# Patient Record
Sex: FEMALE | Race: ASIAN | NOT HISPANIC OR LATINO | URBAN - METROPOLITAN AREA
[De-identification: names, ages, dates, MRNs, and addresses within clinical notes are randomized per-mention and may not be internally consistent; named-entity substitution may affect disease eponyms.]

---

## 2023-05-17 ENCOUNTER — OFFICE VISIT (OUTPATIENT)
Dept: OBSTETRICS AND GYNECOLOGY | Facility: CLINIC | Age: 38
End: 2023-05-17
Payer: COMMERCIAL

## 2023-05-17 ENCOUNTER — APPOINTMENT (OUTPATIENT)
Dept: LAB | Facility: HOSPITAL | Age: 38
End: 2023-05-17
Attending: OBSTETRICS & GYNECOLOGY
Payer: COMMERCIAL

## 2023-05-17 VITALS — DIASTOLIC BLOOD PRESSURE: 78 MMHG | SYSTOLIC BLOOD PRESSURE: 100 MMHG | WEIGHT: 135.81 LBS

## 2023-05-17 DIAGNOSIS — Z12.31 BREAST CANCER SCREENING BY MAMMOGRAM: ICD-10-CM

## 2023-05-17 DIAGNOSIS — Z11.3 SCREENING FOR STD (SEXUALLY TRANSMITTED DISEASE): ICD-10-CM

## 2023-05-17 DIAGNOSIS — Z11.3 SCREEN FOR STD (SEXUALLY TRANSMITTED DISEASE): Primary | ICD-10-CM

## 2023-05-17 DIAGNOSIS — Z30.09 FAMILY PLANNING: ICD-10-CM

## 2023-05-17 DIAGNOSIS — Z01.419 WELL WOMAN EXAM WITH ROUTINE GYNECOLOGICAL EXAM: Primary | ICD-10-CM

## 2023-05-17 DIAGNOSIS — Z12.4 ENCOUNTER FOR PAPANICOLAOU SMEAR FOR CERVICAL CANCER SCREENING: ICD-10-CM

## 2023-05-17 PROCEDURE — 99999 PR PBB SHADOW E&M-NEW PATIENT-LVL II: CPT | Mod: PBBFAC,,, | Performed by: OBSTETRICS & GYNECOLOGY

## 2023-05-17 PROCEDURE — 88175 CYTOPATH C/V AUTO FLUID REDO: CPT | Performed by: PATHOLOGY

## 2023-05-17 PROCEDURE — 1159F PR MEDICATION LIST DOCUMENTED IN MEDICAL RECORD: ICD-10-PCS | Mod: CPTII,S$GLB,, | Performed by: OBSTETRICS & GYNECOLOGY

## 2023-05-17 PROCEDURE — 99386 PREV VISIT NEW AGE 40-64: CPT | Mod: S$GLB,,, | Performed by: OBSTETRICS & GYNECOLOGY

## 2023-05-17 PROCEDURE — 99999 PR PBB SHADOW E&M-NEW PATIENT-LVL II: ICD-10-PCS | Mod: PBBFAC,,, | Performed by: OBSTETRICS & GYNECOLOGY

## 2023-05-17 PROCEDURE — 1159F MED LIST DOCD IN RCRD: CPT | Mod: CPTII,S$GLB,, | Performed by: OBSTETRICS & GYNECOLOGY

## 2023-05-17 PROCEDURE — 99386 PR PREVENTIVE VISIT,NEW,40-64: ICD-10-PCS | Mod: S$GLB,,, | Performed by: OBSTETRICS & GYNECOLOGY

## 2023-05-17 PROCEDURE — 3078F DIAST BP <80 MM HG: CPT | Mod: CPTII,S$GLB,, | Performed by: OBSTETRICS & GYNECOLOGY

## 2023-05-17 PROCEDURE — 3078F PR MOST RECENT DIASTOLIC BLOOD PRESSURE < 80 MM HG: ICD-10-PCS | Mod: CPTII,S$GLB,, | Performed by: OBSTETRICS & GYNECOLOGY

## 2023-05-17 PROCEDURE — 3074F PR MOST RECENT SYSTOLIC BLOOD PRESSURE < 130 MM HG: ICD-10-PCS | Mod: CPTII,S$GLB,, | Performed by: OBSTETRICS & GYNECOLOGY

## 2023-05-17 PROCEDURE — 87624 HPV HI-RISK TYP POOLED RSLT: CPT | Performed by: OBSTETRICS & GYNECOLOGY

## 2023-05-17 PROCEDURE — 3074F SYST BP LT 130 MM HG: CPT | Mod: CPTII,S$GLB,, | Performed by: OBSTETRICS & GYNECOLOGY

## 2023-05-17 PROCEDURE — 87591 N.GONORRHOEAE DNA AMP PROB: CPT | Performed by: OBSTETRICS & GYNECOLOGY

## 2023-05-17 NOTE — PROGRESS NOTES
SUBJECTIVE:   Yuval Weeks is a 38 y.o. female No obstetric history on file.  for annual well woman exam. Patient's last menstrual period was 2023 (exact date)..      Pt stated she recently had breast MMG in [place] in April and was told she will need FNA on left breast, BIRAD 3??    Contraception: none    No past medical history on file.  No past surgical history on file.  Social History     Socioeconomic History    Marital status:      No family history on file.  OB History    Para Term  AB Living   1         1   SAB IAB Ectopic Multiple Live Births                  # Outcome Date GA Lbr Mich/2nd Weight Sex Delivery Anes PTL Lv   1                Obstetric Comments   Gynhx: reg/5. Heavy   Denies abnl pap         No current outpatient medications on file.     No current facility-administered medications for this visit.     Allergies: Patient has no known allergies.       ROS:  GENERAL: Denies weight gain or weight loss. Feeling well overall.   SKIN: Denies rash or lesions.   HEAD: Denies head injury or headache.   NODES: Denies enlarged lymph nodes.   CHEST: Denies chest pain or shortness of breath.   CARDIOVASCULAR: Denies palpitations or left sided chest pain.   ABDOMEN: No abdominal pain, constipation, diarrhea, nausea, vomiting or rectal bleeding.   URINARY: No frequency, dysuria, hematuria, or burning on urination.  REPRODUCTIVE: Denies vaginal discharge, abnormal vaginal bleeding, lesions, pelvic pain  BREASTS: The patient performs breast self-examination and denies pain, lumps, or nipple discharge.   HEMATOLOGIC: No easy bruisability or excessive bleeding.  MUSCULOSKELETAL: Denies joint pain or swelling.   NEUROLOGIC: Denies syncope or weakness.   PSYCHIATRIC: Denies depression, anxiety or mood swings.      OBJECTIVE:   /78   Wt 61.6 kg (135 lb 12.9 oz)   LMP 2023 (Exact Date)   The patient appears well, alert, oriented x 3, in no distress.  PSYCH:  Normal, full  range of affect  NECK: negative, no thyromegaly, trachea midline  SKIN: normal, good color, good turgor and no acne, striae, hirsutism  BREAST EXAM: breasts appear normal, no suspicious masses, no skin or nipple changes or axillary nodes  ABDOMEN: soft, non-tender; bowel sounds normal; no masses,  no organomegaly and no hernias, masses, or hepatosplenomegaly  GENITALIA: normal external genitalia, no erythema, no discharge  BLADDER: soft  URETHRA: normal appearing urethra with no masses, tenderness or lesions and normal urethra, normal urethral meatus  VAGINA: Normal  CERVIX: no lesions or cervical motion tenderness and menstrual blood noted  UTERUS: normal size, contour, position, consistency, mobility, non-tender  ADNEXA: no mass, fullness, tenderness      ASSESSMENT:   1. Health maintenance  -pap done. Discussed ASCCP guideline screening every 3 - 5 years.   -screening MMG ordered  -counseled on exercise and healthy diet, weight loss  -bone health:  Discussed Vitamin D and Calcium supplementation, weight bearing exercises  2.  Discussed safety at home/school/work, healthy and balanced diet, sleep hygiene, as well as violence/weapons exposure.   3. -contraception:  Discussed options and the r/b/a of OCP, patches, nuvaring, depo injection, implants, hormonal and non-hormonal IUDs.    We discussed the advantages and disadvantages of each. We discussed the continued use of condoms to prevent STDs.  Pt elects mirena.  Gc/ct done.  Advised condoms in the interim, will check IUD benefit

## 2023-05-18 LAB
C TRACH DNA SPEC QL NAA+PROBE: NOT DETECTED
N GONORRHOEA DNA SPEC QL NAA+PROBE: NOT DETECTED

## 2023-05-23 ENCOUNTER — HOSPITAL ENCOUNTER (OUTPATIENT)
Dept: RADIOLOGY | Facility: HOSPITAL | Age: 38
Discharge: HOME | End: 2023-05-23
Attending: OBSTETRICS & GYNECOLOGY
Payer: COMMERCIAL

## 2023-05-23 VITALS — BODY MASS INDEX: 24.84 KG/M2 | HEIGHT: 62 IN | WEIGHT: 135 LBS

## 2023-05-23 DIAGNOSIS — Z12.31 BREAST CANCER SCREENING BY MAMMOGRAM: ICD-10-CM

## 2023-05-23 PROCEDURE — 77067 SCR MAMMO BI INCL CAD: CPT | Mod: 26,,, | Performed by: RADIOLOGY

## 2023-05-23 PROCEDURE — 77067 MAMMO DIGITAL SCREENING BILAT WITH TOMO: ICD-10-PCS | Mod: 26,,, | Performed by: RADIOLOGY

## 2023-05-23 PROCEDURE — 77063 MAMMO DIGITAL SCREENING BILAT WITH TOMO: ICD-10-PCS | Mod: 26,,, | Performed by: RADIOLOGY

## 2023-05-23 PROCEDURE — 77067 SCR MAMMO BI INCL CAD: CPT | Mod: TC

## 2023-05-23 PROCEDURE — 77063 BREAST TOMOSYNTHESIS BI: CPT | Mod: 26,,, | Performed by: RADIOLOGY

## 2023-05-24 LAB
FINAL PATHOLOGIC DIAGNOSIS: ABNORMAL
Lab: ABNORMAL

## 2023-05-25 LAB
HPV HR 12 DNA SPEC QL NAA+PROBE: NEGATIVE
HPV16 AG SPEC QL: NEGATIVE
HPV18 DNA SPEC QL NAA+PROBE: NEGATIVE

## 2023-06-19 ENCOUNTER — PROCEDURE VISIT (OUTPATIENT)
Dept: OBSTETRICS AND GYNECOLOGY | Facility: CLINIC | Age: 38
End: 2023-06-19
Payer: COMMERCIAL

## 2023-06-19 VITALS — DIASTOLIC BLOOD PRESSURE: 78 MMHG | WEIGHT: 135.56 LBS | SYSTOLIC BLOOD PRESSURE: 108 MMHG | BODY MASS INDEX: 24.8 KG/M2

## 2023-06-19 DIAGNOSIS — Z12.4 ENCOUNTER FOR PAPANICOLAOU SMEAR FOR CERVICAL CANCER SCREENING: Primary | ICD-10-CM

## 2023-06-19 DIAGNOSIS — R87.615 ENCOUNTER FOR REPEAT PAP SMEAR DUE TO PREVIOUS INSUFFICIENT CERVICAL CELLS: ICD-10-CM

## 2023-06-19 DIAGNOSIS — Z30.430 ENCOUNTER FOR INSERTION OF MIRENA IUD: ICD-10-CM

## 2023-06-19 PROCEDURE — 58300 INSERTION OF IUD: ICD-10-PCS | Mod: S$GLB,,, | Performed by: OBSTETRICS & GYNECOLOGY

## 2023-06-19 PROCEDURE — 58300 INSERT INTRAUTERINE DEVICE: CPT | Mod: S$GLB,,, | Performed by: OBSTETRICS & GYNECOLOGY

## 2023-06-19 PROCEDURE — 88175 CYTOPATH C/V AUTO FLUID REDO: CPT | Performed by: PATHOLOGY

## 2023-06-19 PROCEDURE — 87624 HPV HI-RISK TYP POOLED RSLT: CPT | Performed by: OBSTETRICS & GYNECOLOGY

## 2023-06-19 NOTE — PROCEDURES
Yuval Weeks is a 38 y.o. female patient.    BP: 108/78 (06/19/23 1422)  Weight: 61.5 kg (135 lb 9.3 oz) (06/19/23 1422)       Procedures    6/19/2023

## 2023-06-19 NOTE — PROCEDURES
Insertion of IUD    Date/Time: 6/19/2023 2:15 PM  Performed by: Charmaine Winchester Mai, MD  Authorized by: Charmaine Winchester Mai, MD     Consent:     Consent obtained:  Written    Consent given by:  Patient    Procedure risks and benefits discussed: yes      Patient questions answered: yes      Patient agrees, verbalizes understanding, and wants to proceed: yes      Educational handouts given: yes      Instructions and paperwork completed: yes    Procedure:     Pelvic exam performed: yes      Negative GC/chlamydia test: yes      Negative urine pregnancy test: yes      Cervix cleaned and prepped: yes      Speculum placed in vagina: yes      Tenaculum applied to cervix: yes      Uterus sounded: yes      Uterus sound depth (cm):  8.5    IUD inserted with no complications: yes      Strings trimmed: yes    1 Intra Uterine Device levonorgestreL 21 mcg/24 hours (8 yrs) 52 mg     Post-procedure:     Patient tolerated procedure well: yes      Patient will follow up after next period: yes    Comments:      Repeat pap due to insufficient cells

## 2023-06-22 LAB
FINAL PATHOLOGIC DIAGNOSIS: ABNORMAL
Lab: ABNORMAL

## 2023-06-26 LAB
HPV HR 12 DNA SPEC QL NAA+PROBE: POSITIVE
HPV16 AG SPEC QL: NEGATIVE
HPV18 DNA SPEC QL NAA+PROBE: NEGATIVE

## 2023-07-03 ENCOUNTER — TELEPHONE (OUTPATIENT)
Dept: OBSTETRICS AND GYNECOLOGY | Facility: CLINIC | Age: 38
End: 2023-07-03
Payer: COMMERCIAL

## 2023-07-03 NOTE — TELEPHONE ENCOUNTER
Left message to discuss test results.       ----- Message from SaminaDelfina Winchester Mai, MD sent at 7/3/2023  4:19 PM CDT -----  Please schedule colpo

## 2023-07-24 ENCOUNTER — PROCEDURE VISIT (OUTPATIENT)
Dept: OBSTETRICS AND GYNECOLOGY | Facility: CLINIC | Age: 38
End: 2023-07-24
Payer: COMMERCIAL

## 2023-07-24 VITALS
WEIGHT: 135.38 LBS | BODY MASS INDEX: 24.76 KG/M2 | SYSTOLIC BLOOD PRESSURE: 108 MMHG | DIASTOLIC BLOOD PRESSURE: 70 MMHG

## 2023-07-24 DIAGNOSIS — R87.610 ASCUS WITH POSITIVE HIGH RISK HPV CERVICAL: Primary | ICD-10-CM

## 2023-07-24 DIAGNOSIS — Z30.431 IUD CHECK UP: ICD-10-CM

## 2023-07-24 DIAGNOSIS — R87.810 ASCUS WITH POSITIVE HIGH RISK HPV CERVICAL: Primary | ICD-10-CM

## 2023-07-24 PROCEDURE — 88305 TISSUE EXAM BY PATHOLOGIST: ICD-10-PCS | Mod: 26,,, | Performed by: PATHOLOGY

## 2023-07-24 PROCEDURE — 57454 BX/CURETT OF CERVIX W/SCOPE: CPT | Mod: S$GLB,,, | Performed by: OBSTETRICS & GYNECOLOGY

## 2023-07-24 PROCEDURE — 88342 CHG IMMUNOCYTOCHEMISTRY: ICD-10-PCS | Mod: 26,,, | Performed by: PATHOLOGY

## 2023-07-24 PROCEDURE — 88305 TISSUE EXAM BY PATHOLOGIST: CPT | Performed by: PATHOLOGY

## 2023-07-24 PROCEDURE — 88305 TISSUE EXAM BY PATHOLOGIST: CPT | Mod: 26,,, | Performed by: PATHOLOGY

## 2023-07-24 PROCEDURE — 57454 COLPOSCOPY: ICD-10-PCS | Mod: S$GLB,,, | Performed by: OBSTETRICS & GYNECOLOGY

## 2023-07-24 PROCEDURE — 88342 IMHCHEM/IMCYTCHM 1ST ANTB: CPT | Performed by: PATHOLOGY

## 2023-07-24 PROCEDURE — 88342 IMHCHEM/IMCYTCHM 1ST ANTB: CPT | Mod: 26,,, | Performed by: PATHOLOGY

## 2023-07-24 NOTE — PROCEDURES
Colposcopy    Date/Time: 7/24/2023 11:00 AM  Performed by: Charmaine Winchester Mai, MD  Authorized by: Charmaine Winchester Mai, MD     Consent Done?:  Yes (Written)  Assistants?: No      Colposcopy Site:  Cervix  Position:  Supine  Acrowhite Lesion? Yes    Atypical Vessels: No    Transformation Zone Adequate?: Yes    Biopsy?: Yes         Location:  Cervix ((3 00, 6 00 and 12 00))  ECC Performed?: Yes    LEEP Performed?: No    Estimated blood loss (cc):  1   Patient tolerated the procedure well with no immediate complications.     6/2023, ascus/hpv HR positive  S/p gardasil in [place]    Cervix with IUD strings seen - 2 cm

## 2023-07-31 LAB
COMMENT: NORMAL
FINAL PATHOLOGIC DIAGNOSIS: NORMAL
GROSS: NORMAL
Lab: NORMAL

## 2024-03-25 ENCOUNTER — OFFICE VISIT (OUTPATIENT)
Dept: OBSTETRICS AND GYNECOLOGY | Facility: CLINIC | Age: 39
End: 2024-03-25
Payer: COMMERCIAL

## 2024-03-25 VITALS
DIASTOLIC BLOOD PRESSURE: 80 MMHG | WEIGHT: 134.69 LBS | SYSTOLIC BLOOD PRESSURE: 110 MMHG | BODY MASS INDEX: 24.64 KG/M2

## 2024-03-25 DIAGNOSIS — Z80.3 FAMILY HISTORY OF BREAST CANCER: Primary | ICD-10-CM

## 2024-03-25 PROCEDURE — 99212 OFFICE O/P EST SF 10 MIN: CPT | Mod: S$GLB,,, | Performed by: OBSTETRICS & GYNECOLOGY

## 2024-03-25 PROCEDURE — 3074F SYST BP LT 130 MM HG: CPT | Mod: CPTII,S$GLB,, | Performed by: OBSTETRICS & GYNECOLOGY

## 2024-03-25 PROCEDURE — 99999 PR PBB SHADOW E&M-EST. PATIENT-LVL II: CPT | Mod: PBBFAC,,, | Performed by: OBSTETRICS & GYNECOLOGY

## 2024-03-25 PROCEDURE — 3079F DIAST BP 80-89 MM HG: CPT | Mod: CPTII,S$GLB,, | Performed by: OBSTETRICS & GYNECOLOGY

## 2024-03-25 PROCEDURE — 1159F MED LIST DOCD IN RCRD: CPT | Mod: CPTII,S$GLB,, | Performed by: OBSTETRICS & GYNECOLOGY

## 2024-03-25 PROCEDURE — 3008F BODY MASS INDEX DOCD: CPT | Mod: CPTII,S$GLB,, | Performed by: OBSTETRICS & GYNECOLOGY

## 2024-03-25 NOTE — PROGRESS NOTES
SUBJECTIVE:   38 y.o. female   for breast bump    No LMP recorded. Patient has had an implant..    Pt noted right breast bump last month.  Now subsided   Nervous about breast cancer  Her aunt in [facility] was diagnosed of breast CA @ age 41  Another cousin in [place] just diagnosed with breast cancer, she is in her 30s.  Unsure about genetic testing        OB History    Para Term  AB Living   1 1 1     1   SAB IAB Ectopic Multiple Live Births                  # Outcome Date GA Lbr Mich/2nd Weight Sex Delivery Anes PTL Lv   1 Term               Obstetric Comments   Gynhx: reg/5. Heavy   Mirena 2023   Denies abnl pap, ascus hpv positive 2023 -->    [place] gard[place]     No past medical history on file.  No past surgical history on file.  Social History     Socioeconomic History    Marital status:    Tobacco Use    Smoking status: Never    Smokeless tobacco: Never     Family History   Problem Relation Age of Onset    Breast cancer Maternal Aunt 40        in [facility]    Breast cancer Cousin 38         No current outpatient medications on file.     Current Facility-Administered Medications   Medication Dose Route Frequency Provider Last Rate Last Admin    levonorgestreL (MIRENA) 21 mcg/24 hours (8 yrs) 52 mg IUD 1 Intra Uterine Device  1 Intra Uterine Device Intrauterine  Charmaine Abrams MD   1 Intra Uterine Device at 23 1415     Allergies: Patient has no known allergies.       ROS  ROS:  GENERAL: Denies weight gain or weight loss. Feeling well overall.   SKIN: Denies rash or lesions.   HEAD: Denies head injury or headache.   NODES: Denies enlarged lymph nodes.   CHEST: Denies chest pain or shortness of breath.   CARDIOVASCULAR: Denies palpitations or left sided chest pain.   ABDOMEN: No abdominal pain, constipation, diarrhea, nausea, vomiting or rectal bleeding.   URINARY: No frequency, dysuria, hematuria, or burning on urination.  REPRODUCTIVE: Denies vaginal discharge,  abnormal vaginal bleeding, lesions, pelvic pain  BREASTS: The patient performs breast self-examination and denies pain, lumps, or nipple discharge.   HEMATOLOGIC: No easy bruisability or excessive bleeding.  MUSCULOSKELETAL: Denies joint pain or swelling.   NEUROLOGIC: Denies syncope or weakness.   PSYCHIATRIC: Denies depression, anxiety or mood swings.      OBJECTIVE:   /80   Wt 61.1 kg (134 lb 11.2 oz)   BMI 24.64 kg/m²   The patient appears well, alert, oriented x 3, in no distress.  NECK: negative, no thyromegaly, trachea midline  SKIN: normal, good color, good turgor, and no acne, striae, hirsutism  BREAST EXAM: breasts appear normal, no suspicious masses, no skin or nipple changes or axillary nodes, the area of concern was a superficial skin lesion that is healing, appears to be cutaneous in origin not breast        ASSESSMENT:    Reassured pt, MMG in 5/2023 neg  FH of breast cancer: advised to speak with her cousin in [place] regarding genetic testing. If not able  - pt will need genetic testing  F/u in 5/2024 for wwe

## 2024-03-28 ENCOUNTER — TELEPHONE (OUTPATIENT)
Dept: FAMILY MEDICINE | Facility: CLINIC | Age: 39
End: 2024-03-28
Payer: COMMERCIAL

## 2024-03-28 NOTE — TELEPHONE ENCOUNTER
With assistance of Language line-  [phone] unable to leave message. Voicemail not set up yet, but patient will have to call back on next week. Provider not in clinic at this time.

## 2024-03-28 NOTE — TELEPHONE ENCOUNTER
----- Message from Namita Bundy sent at 3/28/2024 12:08 PM CDT -----  Regarding: patient call back  Type: Patient Call Back    Who called: Self     What is the request in detail: asked for a call back to get scheduled as NP. There were no available apts to schedule from.     Can the clinic reply by [facility]? No     Would the patient rather a call back or a response via [facility]? Call     Best call back number: .[phone]

## 2024-05-07 ENCOUNTER — OFFICE VISIT (OUTPATIENT)
Dept: OBSTETRICS AND GYNECOLOGY | Facility: CLINIC | Age: 39
End: 2024-05-07
Payer: COMMERCIAL

## 2024-05-07 VITALS — BODY MASS INDEX: 25 KG/M2 | SYSTOLIC BLOOD PRESSURE: 108 MMHG | WEIGHT: 136.69 LBS | DIASTOLIC BLOOD PRESSURE: 80 MMHG

## 2024-05-07 DIAGNOSIS — Z01.419 WELL WOMAN EXAM WITH ROUTINE GYNECOLOGICAL EXAM: Primary | ICD-10-CM

## 2024-05-07 DIAGNOSIS — Z12.4 ENCOUNTER FOR PAPANICOLAOU SMEAR FOR CERVICAL CANCER SCREENING: ICD-10-CM

## 2024-05-07 DIAGNOSIS — Z80.3 FAMILY HISTORY OF BREAST CANCER: ICD-10-CM

## 2024-05-07 DIAGNOSIS — Z12.31 BREAST CANCER SCREENING BY MAMMOGRAM: ICD-10-CM

## 2024-05-07 PROCEDURE — 99396 PREV VISIT EST AGE 40-64: CPT | Mod: S$GLB,,, | Performed by: OBSTETRICS & GYNECOLOGY

## 2024-05-07 PROCEDURE — 3008F BODY MASS INDEX DOCD: CPT | Mod: CPTII,S$GLB,, | Performed by: OBSTETRICS & GYNECOLOGY

## 2024-05-07 PROCEDURE — 87624 HPV HI-RISK TYP POOLED RSLT: CPT | Performed by: OBSTETRICS & GYNECOLOGY

## 2024-05-07 PROCEDURE — 3074F SYST BP LT 130 MM HG: CPT | Mod: CPTII,S$GLB,, | Performed by: OBSTETRICS & GYNECOLOGY

## 2024-05-07 PROCEDURE — 88141 CYTOPATH C/V INTERPRET: CPT | Mod: ,,, | Performed by: PATHOLOGY

## 2024-05-07 PROCEDURE — 3079F DIAST BP 80-89 MM HG: CPT | Mod: CPTII,S$GLB,, | Performed by: OBSTETRICS & GYNECOLOGY

## 2024-05-07 PROCEDURE — 99999 PR PBB SHADOW E&M-EST. PATIENT-LVL II: CPT | Mod: PBBFAC,,, | Performed by: OBSTETRICS & GYNECOLOGY

## 2024-05-07 PROCEDURE — 88175 CYTOPATH C/V AUTO FLUID REDO: CPT | Performed by: PATHOLOGY

## 2024-05-07 NOTE — PROGRESS NOTES
SUBJECTIVE:   Yuval Weeks is a 39 y.o. female   for annual well woman exam. No LMP recorded. Patient has had an implant..    Contraception: mirena IUD since 2023, does not like this IUD since she does not get period  Believed she has to have menstrual bleeding so she can be healthy    FH update: cousin had breast cancer in [place] tested negative for breast cancer gene    No past medical history on file.  No past surgical history on file.  Social History     Socioeconomic History    Marital status:    Tobacco Use    Smoking status: Never    Smokeless tobacco: Never     Family History   Problem Relation Name Age of Onset    Breast cancer Maternal Aunt  40        in [place]    Breast cancer Cousin  38     OB History    Para Term  AB Living   1 1 1     1   SAB IAB Ectopic Multiple Live Births                  # Outcome Date GA Lbr Mich/2nd Weight Sex Type Anes PTL Lv   1 Term               Obstetric Comments   Gynhx: reg/5. Heavy   Mirena 2023   Denies abnl pap, ascus hpv positive 2023 --> neg colpo   [place]         No current outpatient medications on file.     Current Facility-Administered Medications   Medication Dose Route Frequency Provider Last Rate Last Admin    levonorgestreL (MIRENA) 21 mcg/24 hours (8 yrs) 52 mg IUD 1 Intra Uterine Device  1 Intra Uterine Device Intrauterine  Samina Abrams-Sal Winchester MD   1 Intra Uterine Device at 23 1415     Allergies: Patient has no known allergies.       ROS:  GENERAL: Denies weight gain or weight loss. Feeling well overall.   SKIN: Denies rash or lesions.   HEAD: Denies head injury or headache.   NODES: Denies enlarged lymph nodes.   CHEST: Denies chest pain or shortness of breath.   CARDIOVASCULAR: Denies palpitations or left sided chest pain.   ABDOMEN: No abdominal pain, constipation, diarrhea, nausea, vomiting or rectal bleeding.   URINARY: No frequency, dysuria, hematuria, or burning on urination.  REPRODUCTIVE: Denies  vaginal discharge, abnormal vaginal bleeding, lesions, pelvic pain  BREASTS: The patient performs breast self-examination and denies pain, lumps, or nipple discharge.   HEMATOLOGIC: No easy bruisability or excessive bleeding.  MUSCULOSKELETAL: Denies joint pain or swelling.   NEUROLOGIC: Denies syncope or weakness.   PSYCHIATRIC: Denies depression, anxiety or mood swings.      OBJECTIVE:   /80   Wt 62 kg (136 lb 11 oz)   BMI 25.00 kg/m²   The patient appears well, alert, oriented x 3, in no distress.  PSYCH:  Normal, full range of affect  NECK: negative, no thyromegaly, trachea midline  SKIN: normal, good color, good turgor and no acne, striae, hirsutism  BREAST EXAM: breasts appear normal, no suspicious masses, no skin or nipple changes or axillary nodes  ABDOMEN: soft, non-tender; bowel sounds normal; no masses,  no organomegaly and no hernias, masses, or hepatosplenomegaly  GENITALIA: normal external genitalia, no erythema, no discharge  BLADDER: soft  URETHRA: normal appearing urethra with no masses, tenderness or lesions and normal urethra, normal urethral meatus  VAGINA: Normal  CERVIX: no lesions or cervical motion tenderness, iud strings seen  UTERUS: normal size, contour, position, consistency, mobility, non-tender  ADNEXA: no mass, fullness, tenderness      ASSESSMENT:   .Cam was seen today for well woman.    Diagnoses and all orders for this visit:    Well woman exam with routine gynecological exam    Encounter for Papanicolaou smear for cervical cancer screening    Family history of breast cancer        1. Health maintenance  -ASCUS/HPV positive pap 2023, colpo neg. Repeat pap today, [place] gard[place]il in [place]  -screening MMG ordered  -counseled on exercise and healthy diet, weight loss  -bone health:  Discussed Vitamin D and Calcium supplementation, weight bearing exercises  2.  Discussed safety at home/school/work, healthy and balanced diet, sleep hygiene, as well as violence/weapons exposure.   3.   Contraception:  discussed menstrual flow is not necessary, however pt desired exchange for copper IUD. Will check benefit - advised pt paragard is in back order and will call when supply arrives

## 2024-05-16 LAB
FINAL PATHOLOGIC DIAGNOSIS: ABNORMAL
Lab: ABNORMAL

## 2024-05-28 ENCOUNTER — HOSPITAL ENCOUNTER (OUTPATIENT)
Dept: RADIOLOGY | Facility: HOSPITAL | Age: 39
Discharge: HOME | End: 2024-05-28
Attending: OBSTETRICS & GYNECOLOGY
Payer: COMMERCIAL

## 2024-05-28 DIAGNOSIS — Z12.31 BREAST CANCER SCREENING BY MAMMOGRAM: ICD-10-CM

## 2024-05-28 PROCEDURE — 77067 SCR MAMMO BI INCL CAD: CPT | Mod: 26,,, | Performed by: RADIOLOGY

## 2024-05-28 PROCEDURE — 77063 BREAST TOMOSYNTHESIS BI: CPT | Mod: 26,,, | Performed by: RADIOLOGY

## 2024-05-28 PROCEDURE — 77063 BREAST TOMOSYNTHESIS BI: CPT | Mod: TC

## 2024-06-10 DIAGNOSIS — Z91.89 AT HIGH RISK FOR BREAST CANCER: Primary | ICD-10-CM

## 2024-06-10 NOTE — PROGRESS NOTES
Pt with normal mammogram but her risk score for breast cancer is high  I have placed referral to breast surgery

## 2025-06-09 ENCOUNTER — HOSPITAL ENCOUNTER (OUTPATIENT)
Dept: RADIOLOGY | Facility: HOSPITAL | Age: 40
Discharge: HOME | End: 2025-06-09
Payer: COMMERCIAL

## 2025-06-09 DIAGNOSIS — Z12.31 ENCOUNTER FOR SCREENING MAMMOGRAM FOR BREAST CANCER: ICD-10-CM

## 2025-06-09 PROCEDURE — 77063 BREAST TOMOSYNTHESIS BI: CPT | Mod: TC

## 2025-06-09 PROCEDURE — 77067 SCR MAMMO BI INCL CAD: CPT | Mod: 26,,, | Performed by: RADIOLOGY

## 2025-06-09 PROCEDURE — 77063 BREAST TOMOSYNTHESIS BI: CPT | Mod: 26,,, | Performed by: RADIOLOGY
